# Patient Record
Sex: MALE | URBAN - METROPOLITAN AREA
[De-identification: names, ages, dates, MRNs, and addresses within clinical notes are randomized per-mention and may not be internally consistent; named-entity substitution may affect disease eponyms.]

---

## 2019-07-20 RX ORDER — BUPRENORPHINE AND NALOXONE 2; .5 MG/1; MG/1
1 TABLET SUBLINGUAL DAILY
Refills: 0 | Status: DISCONTINUED | OUTPATIENT
Start: 2019-07-21 | End: 2019-07-21

## 2019-07-20 RX ORDER — CLONAZEPAM 1 MG
1 TABLET ORAL
Refills: 0 | Status: DISCONTINUED | OUTPATIENT
Start: 2019-07-21 | End: 2019-07-21

## 2019-07-20 RX ORDER — OXCARBAZEPINE 300 MG/1
300 TABLET, FILM COATED ORAL
Refills: 0 | Status: DISCONTINUED | OUTPATIENT
Start: 2019-07-21 | End: 2019-07-21

## 2019-07-21 ENCOUNTER — INPATIENT (INPATIENT)
Facility: HOSPITAL | Age: 49
LOS: 0 days | Discharge: HOME | DRG: 918 | End: 2019-07-21
Attending: STUDENT IN AN ORGANIZED HEALTH CARE EDUCATION/TRAINING PROGRAM | Admitting: STUDENT IN AN ORGANIZED HEALTH CARE EDUCATION/TRAINING PROGRAM
Payer: COMMERCIAL

## 2019-07-21 VITALS
TEMPERATURE: 98 F | RESPIRATION RATE: 18 BRPM | HEART RATE: 69 BPM | DIASTOLIC BLOOD PRESSURE: 62 MMHG | OXYGEN SATURATION: 96 % | SYSTOLIC BLOOD PRESSURE: 99 MMHG

## 2019-07-21 VITALS
DIASTOLIC BLOOD PRESSURE: 76 MMHG | OXYGEN SATURATION: 98 % | HEART RATE: 50 BPM | TEMPERATURE: 98 F | SYSTOLIC BLOOD PRESSURE: 124 MMHG | RESPIRATION RATE: 16 BRPM | WEIGHT: 156.97 LBS | HEIGHT: 64 IN

## 2019-07-21 PROCEDURE — 99222 1ST HOSP IP/OBS MODERATE 55: CPT

## 2019-07-21 RX ORDER — CHLORPROMAZINE HCL 10 MG
100 TABLET ORAL THREE TIMES A DAY
Refills: 0 | Status: DISCONTINUED | OUTPATIENT
Start: 2019-07-21 | End: 2019-07-21

## 2019-07-21 RX ORDER — CHLORPROMAZINE HCL 10 MG
50 TABLET ORAL EVERY 6 HOURS
Refills: 0 | Status: DISCONTINUED | OUTPATIENT
Start: 2019-07-21 | End: 2019-07-21

## 2019-07-21 RX ORDER — CHLORPROMAZINE HCL 10 MG
100 TABLET ORAL
Refills: 0 | Status: DISCONTINUED | OUTPATIENT
Start: 2019-07-21 | End: 2019-07-21

## 2019-07-21 RX ORDER — PRAZOSIN HCL 2 MG
1 CAPSULE ORAL AT BEDTIME
Refills: 0 | Status: DISCONTINUED | OUTPATIENT
Start: 2019-07-21 | End: 2019-07-21

## 2019-07-21 RX ORDER — CHLORPROMAZINE HCL 10 MG
50 TABLET ORAL EVERY 6 HOURS
Refills: 0 | Status: COMPLETED | OUTPATIENT
Start: 2019-07-21 | End: 2020-06-18

## 2019-07-21 RX ADMIN — BUPRENORPHINE AND NALOXONE 1 TABLET(S): 2; .5 TABLET SUBLINGUAL at 05:53

## 2019-07-21 RX ADMIN — Medication 100 MILLIGRAM(S): at 11:46

## 2019-07-21 RX ADMIN — OXCARBAZEPINE 300 MILLIGRAM(S): 300 TABLET, FILM COATED ORAL at 05:53

## 2019-07-21 RX ADMIN — Medication 1 MILLIGRAM(S): at 05:53

## 2019-07-21 RX ADMIN — Medication 50 MILLIGRAM(S): at 13:55

## 2019-07-21 NOTE — BEHAVIORAL HEALTH ASSESSMENT NOTE - NSBHREFEROTHER_PSY_A_CORE_FT
[facility] - transferred from ED for admission to [facility]

## 2019-07-21 NOTE — CHART NOTE - NSCHARTNOTEFT_GEN_A_CORE
Patient transferred from [facility] overnight, under 9.27 legal status, by Telepsychiatry. Patient arrived safely to unit, is currently calm, resting comfortably in his room. He received some of his medications this morning, including Suboxone and Klonopin.     Full note to follow.
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
## 2019-07-21 NOTE — BEHAVIORAL HEALTH ASSESSMENT NOTE - NSBHCHARTREVIEWVS_PSY_A_CORE FT
Vital Signs Last 24 Hrs  T(C): 36.8 (21 Jul 2019 04:15), Max: 36.8 (21 Jul 2019 04:15)  T(F): 98.2 (21 Jul 2019 04:15), Max: 98.2 (21 Jul 2019 04:15)  HR: 50 (21 Jul 2019 04:15) (50 - 50)  BP: 124/76 (21 Jul 2019 04:15) (124/76 - 124/76)  BP(mean): --  RR: 16 (21 Jul 2019 04:15) (16 - 16)  SpO2: 98% (21 Jul 2019 04:15) (98% - 98%)

## 2019-07-21 NOTE — BEHAVIORAL HEALTH ASSESSMENT NOTE - NSBHADMITCOUNSEL_PSY_A_CORE
instructions for management, treatment and follow up/diagnostic results/impressions and/or recommended studies

## 2019-07-21 NOTE — BEHAVIORAL HEALTH ASSESSMENT NOTE - NSBHMEDSOTHERFT_PSY_A_CORE
Thorazine 100mg PO TID  Trileptal 300mg PO BID  Klonopin 1mg PO BID  SubOxone 8-2mg SL qDaily  Celexa daily

## 2019-07-21 NOTE — BEHAVIORAL HEALTH ASSESSMENT NOTE - SUMMARY
48M with history of schizoaffective disorder, PTSD, opiate use disorder, antisocial personality disorder, admitted to [facility], transferred from [place] for admission due to disorganized and possibly manic behavior. Patient received multiple doses of sedating medications prior to arrival and is currently resting with no acute complaints or symptoms of agitation. Due to concern for manic episode, substance, and risk for violence, patient requires continued hospitalization for safety and stabilization.     Plan:   1. Admit to [facility] under 9.27 legal status  2. Continue Trileptal 300mg PO BID for mood stabilizatoin  3. Verify Suboxone dose, patient already received 8-2mg SL this morning  4. Consider decreasing total dose of Klonopin given risk of respiratory depression when used concomitantly with Suboxone  5. Continue Thorazine 100mg PO TID, consider decrease in total daily dose as this is quite sedating due to its anti-cholinergic effects  6. I/G/M therapy
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
## 2019-07-21 NOTE — SBIRT NOTE ADULT - NSSBIRTDRGPOSREINDET_GEN_A_CORE
Per chart, pt. has a history of heroin use. Pt. denies current use, states he stopped in 2005. He takes suboxone for pain management. Per chart, pt. has a history of heroin use/abuse. Pt. denies current use, states he stopped in 2005. He takes suboxone for pain management. Pt. declined any substance abuse resources.

## 2019-07-26 DIAGNOSIS — Z59.0 HOMELESSNESS: ICD-10-CM

## 2019-07-26 DIAGNOSIS — F25.0 SCHIZOAFFECTIVE DISORDER, BIPOLAR TYPE: ICD-10-CM

## 2019-07-26 DIAGNOSIS — T45.0X2A POISONING BY ANTIALLERGIC AND ANTIEMETIC DRUGS, INTENTIONAL SELF-HARM, INITIAL ENCOUNTER: ICD-10-CM

## 2019-07-26 DIAGNOSIS — F11.99 OPIOID USE, UNSPECIFIED WITH UNSPECIFIED OPIOID-INDUCED DISORDER: ICD-10-CM

## 2019-07-26 DIAGNOSIS — F60.2 ANTISOCIAL PERSONALITY DISORDER: ICD-10-CM

## 2019-07-26 DIAGNOSIS — F17.210 NICOTINE DEPENDENCE, CIGARETTES, UNCOMPLICATED: ICD-10-CM

## 2019-07-26 SDOH — ECONOMIC STABILITY - HOUSING INSECURITY: HOMELESSNESS: Z59.0

## 2022-10-06 NOTE — CHART NOTE - NSCHARTNOTESELECT_GEN_ALL_CORE
Event Note Staged Advancement Flap Text: The defect edges were debeveled with a #15 scalpel blade.  Given the location of the defect, shape of the defect and the proximity to free margins a staged advancement flap was deemed most appropriate.  Using a sterile surgical marker, an appropriate advancement flap was drawn incorporating the defect and placing the expected incisions within the relaxed skin tension lines where possible. The area thus outlined was incised deep to adipose tissue with a #15 scalpel blade.  The skin margins were undermined to an appropriate distance in all directions utilizing iris scissors.